# Patient Record
Sex: FEMALE | Race: WHITE | NOT HISPANIC OR LATINO | URBAN - METROPOLITAN AREA
[De-identification: names, ages, dates, MRNs, and addresses within clinical notes are randomized per-mention and may not be internally consistent; named-entity substitution may affect disease eponyms.]

---
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
## 2022-05-25 ENCOUNTER — APPOINTMENT (RX ONLY)
Dept: URBAN - METROPOLITAN AREA CLINIC 115 | Facility: CLINIC | Age: 22
Setting detail: DERMATOLOGY
End: 2022-05-25

## 2022-05-25 DIAGNOSIS — B07.8 OTHER VIRAL WARTS: ICD-10-CM

## 2022-05-25 DIAGNOSIS — L70.0 ACNE VULGARIS: ICD-10-CM

## 2022-05-25 PROCEDURE — 99213 OFFICE O/P EST LOW 20 MIN: CPT | Mod: 25

## 2022-05-25 PROCEDURE — ? BENIGN DESTRUCTION

## 2022-05-25 PROCEDURE — ? COUNSELING

## 2022-05-25 PROCEDURE — ? PRESCRIPTION

## 2022-05-25 PROCEDURE — 17110 DESTRUCTION B9 LES UP TO 14: CPT

## 2022-05-25 RX ORDER — CLINDAMYCIN PHOSPHATE 10 MG/ML
SOLUTION TOPICAL
Qty: 1 | Refills: 0 | Status: ERX | COMMUNITY
Start: 2022-05-25 | End: 2022-09-01

## 2022-05-25 RX ADMIN — CLINDAMYCIN PHOSPHATE: 10 SOLUTION TOPICAL at 00:00

## 2022-05-25 ASSESSMENT — LOCATION DETAILED DESCRIPTION DERM
LOCATION DETAILED: LEFT DISTAL ULNAR THUMB
LOCATION DETAILED: RIGHT MID DORSAL INDEX FINGER
LOCATION DETAILED: RIGHT PROXIMAL PRETIBIAL REGION
LOCATION DETAILED: RIGHT MID DORSAL MIDDLE FINGER
LOCATION DETAILED: LEFT INDEX FINGER DISTAL INTERPHALANGEAL JOINT

## 2022-05-25 ASSESSMENT — LOCATION ZONE DERM
LOCATION ZONE: LEG
LOCATION ZONE: FINGER

## 2022-05-25 ASSESSMENT — LOCATION SIMPLE DESCRIPTION DERM
LOCATION SIMPLE: RIGHT PRETIBIAL REGION
LOCATION SIMPLE: RIGHT INDEX FINGER
LOCATION SIMPLE: RIGHT MIDDLE FINGER
LOCATION SIMPLE: LEFT INDEX FINGER
LOCATION SIMPLE: LEFT THUMB

## 2022-05-25 NOTE — PROCEDURE: BENIGN DESTRUCTION
Consent: The patient's consent was obtained including but not limited to risks of crusting, scabbing, blistering, scarring, darker or lighter pigmentary change, recurrence, incomplete removal and infection.
Post-Care Instructions: I reviewed with the patient in detail post-care instructions. Patient is to wear sunprotection, and avoid picking at any of the treated lesions. Pt may apply Vaseline to crusted or scabbing areas.
Medical Necessity Information: It is in your best interest to select a reason for this procedure from the list below. All of these items fulfill various CMS LCD requirements except the new and changing color options.
Render Post-Care Instructions In Note?: no
Treatment Number (Will Not Render If 0): 0
Anesthesia Volume In Cc: 0.5
Detail Level: Detailed
Medical Necessity Clause: This procedure was medically necessary because the lesions that were treated were:

## 2022-05-25 NOTE — PROCEDURE: COUNSELING
Detail Level: Detailed
Include Pregnancy/Lactation Warning?: No
Doxycycline Counseling:  Patient counseled regarding possible photosensitivity and increased risk for sunburn.  Patient instructed to avoid sunlight, if possible.  When exposed to sunlight, patients should wear protective clothing, sunglasses, and sunscreen.  The patient was instructed to call the office immediately if the following severe adverse effects occur:  hearing changes, easy bruising/bleeding, severe headache, or vision changes.  The patient verbalized understanding of the proper use and possible adverse effects of doxycycline.  All of the patient's questions and concerns were addressed.
Birth Control Pills Counseling: Birth Control Pill Counseling: I discussed with the patient the potential side effects of OCPs including but not limited to increased risk of stroke, heart attack, thrombophlebitis, deep venous thrombosis, hepatic adenomas, breast changes, GI upset, headaches, and depression.  The patient verbalized understanding of the proper use and possible adverse effects of OCPs. All of the patient's questions and concerns were addressed.
Topical Retinoid Pregnancy And Lactation Text: This medication is Pregnancy Category C. It is unknown if this medication is excreted in breast milk.
Isotretinoin Pregnancy And Lactation Text: This medication is Pregnancy Category X and is considered extremely dangerous during pregnancy. It is unknown if it is excreted in breast milk.
Tetracycline Counseling: Patient counseled regarding possible photosensitivity and increased risk for sunburn.  Patient instructed to avoid sunlight, if possible.  When exposed to sunlight, patients should wear protective clothing, sunglasses, and sunscreen.  The patient was instructed to call the office immediately if the following severe adverse effects occur:  hearing changes, easy bruising/bleeding, severe headache, or vision changes.  The patient verbalized understanding of the proper use and possible adverse effects of tetracycline.  All of the patient's questions and concerns were addressed. Patient understands to avoid pregnancy while on therapy due to potential birth defects.
Sarecycline Counseling: Patient advised regarding possible photosensitivity and discoloration of the teeth, skin, lips, tongue and gums.  Patient instructed to avoid sunlight, if possible.  When exposed to sunlight, patients should wear protective clothing, sunglasses, and sunscreen.  The patient was instructed to call the office immediately if the following severe adverse effects occur:  hearing changes, easy bruising/bleeding, severe headache, or vision changes.  The patient verbalized understanding of the proper use and possible adverse effects of sarecycline.  All of the patient's questions and concerns were addressed.
Azelaic Acid Pregnancy And Lactation Text: This medication is considered safe during pregnancy and breast feeding.
Doxycycline Pregnancy And Lactation Text: This medication is Pregnancy Category D and not consider safe during pregnancy. It is also excreted in breast milk but is considered safe for shorter treatment courses.
Azithromycin Counseling:  I discussed with the patient the risks of azithromycin including but not limited to GI upset, allergic reaction, drug rash, diarrhea, and yeast infections.
Topical Sulfur Applications Counseling: Topical Sulfur Counseling: Patient counseled that this medication may cause skin irritation or allergic reactions.  In the event of skin irritation, the patient was advised to reduce the amount of the drug applied or use it less frequently.   The patient verbalized understanding of the proper use and possible adverse effects of topical sulfur application.  All of the patient's questions and concerns were addressed.
Tazorac Counseling:  Patient advised that medication is irritating and drying.  Patient may need to apply sparingly and wash off after an hour before eventually leaving it on overnight.  The patient verbalized understanding of the proper use and possible adverse effects of tazorac.  All of the patient's questions and concerns were addressed.
Birth Control Pills Pregnancy And Lactation Text: This medication should be avoided if pregnant and for the first 30 days post-partum.
Tetracycline Pregnancy And Lactation Text: This medication is Pregnancy Category D and not consider safe during pregnancy. It is also excreted in breast milk.
High Dose Vitamin A Counseling: Side effects reviewed, pt to contact office should one occur.
Erythromycin Counseling:  I discussed with the patient the risks of erythromycin including but not limited to GI upset, allergic reaction, drug rash, diarrhea, increase in liver enzymes, and yeast infections.
Topical Sulfur Applications Pregnancy And Lactation Text: This medication is Pregnancy Category C and has an unknown safety profile during pregnancy. It is unknown if this topical medication is excreted in breast milk.
Azithromycin Pregnancy And Lactation Text: This medication is considered safe during pregnancy and is also secreted in breast milk.
Benzoyl Peroxide Counseling: Patient counseled that medicine may cause skin irritation and bleach clothing.  In the event of skin irritation, the patient was advised to reduce the amount of the drug applied or use it less frequently.   The patient verbalized understanding of the proper use and possible adverse effects of benzoyl peroxide.  All of the patient's questions and concerns were addressed.
Tazorac Pregnancy And Lactation Text: This medication is not safe during pregnancy. It is unknown if this medication is excreted in breast milk.
High Dose Vitamin A Pregnancy And Lactation Text: High dose vitamin A therapy is contraindicated during pregnancy and breast feeding.
Aklief counseling:  Patient advised to apply a pea-sized amount only at bedtime and wait 30 minutes after washing their face before applying.  If too drying, patient may add a non-comedogenic moisturizer.  The most commonly reported side effects including irritation, redness, scaling, dryness, stinging, burning, itching, and increased risk of sunburn.  The patient verbalized understanding of the proper use and possible adverse effects of retinoids.  All of the patient's questions and concerns were addressed.
Spironolactone Counseling: Patient advised regarding risks of diarrhea, abdominal pain, hyperkalemia, birth defects (for female patients), liver toxicity and renal toxicity. The patient may need blood work to monitor liver and kidney function and potassium levels while on therapy. The patient verbalized understanding of the proper use and possible adverse effects of spironolactone.  All of the patient's questions and concerns were addressed.
Dapsone Counseling: I discussed with the patient the risks of dapsone including but not limited to hemolytic anemia, agranulocytosis, rashes, methemoglobinemia, kidney failure, peripheral neuropathy, headaches, GI upset, and liver toxicity.  Patients who start dapsone require monitoring including baseline LFTs and weekly CBCs for the first month, then every month thereafter.  The patient verbalized understanding of the proper use and possible adverse effects of dapsone.  All of the patient's questions and concerns were addressed.
Erythromycin Pregnancy And Lactation Text: This medication is Pregnancy Category B and is considered safe during pregnancy. It is also excreted in breast milk.
Winlevi Counseling:  I discussed with the patient the risks of topical clascoterone including but not limited to erythema, scaling, itching, and stinging. Patient voiced their understanding.
Benzoyl Peroxide Pregnancy And Lactation Text: This medication is Pregnancy Category C. It is unknown if benzoyl peroxide is excreted in breast milk.
Bactrim Counseling:  I discussed with the patient the risks of sulfa antibiotics including but not limited to GI upset, allergic reaction, drug rash, diarrhea, dizziness, photosensitivity, and yeast infections.  Rarely, more serious reactions can occur including but not limited to aplastic anemia, agranulocytosis, methemoglobinemia, blood dyscrasias, liver or kidney failure, lung infiltrates or desquamative/blistering drug rashes.
Topical Clindamycin Counseling: Patient counseled that this medication may cause skin irritation or allergic reactions.  In the event of skin irritation, the patient was advised to reduce the amount of the drug applied or use it less frequently.   The patient verbalized understanding of the proper use and possible adverse effects of clindamycin.  All of the patient's questions and concerns were addressed.
Minocycline Counseling: Patient advised regarding possible photosensitivity and discoloration of the teeth, skin, lips, tongue and gums.  Patient instructed to avoid sunlight, if possible.  When exposed to sunlight, patients should wear protective clothing, sunglasses, and sunscreen.  The patient was instructed to call the office immediately if the following severe adverse effects occur:  hearing changes, easy bruising/bleeding, severe headache, or vision changes.  The patient verbalized understanding of the proper use and possible adverse effects of minocycline.  All of the patient's questions and concerns were addressed.
Aklief Pregnancy And Lactation Text: It is unknown if this medication is safe to use during pregnancy.  It is unknown if this medication is excreted in breast milk.  Breastfeeding women should use the topical cream on the smallest area of the skin for the shortest time needed while breastfeeding.  Do not apply to nipple and areola.
Dapsone Pregnancy And Lactation Text: This medication is Pregnancy Category C and is not considered safe during pregnancy or breast feeding.
Isotretinoin Counseling: Patient should get monthly blood tests, not donate blood, not drive at night if vision affected, not share medication, and not undergo elective surgery for 6 months after tx completed. Side effects reviewed, pt to contact office should one occur.
Bactrim Pregnancy And Lactation Text: This medication is Pregnancy Category D and is known to cause fetal risk.  It is also excreted in breast milk.
Winlevi Pregnancy And Lactation Text: This medication is considered safe during pregnancy and breastfeeding.
Topical Retinoid counseling:  Patient advised to apply a pea-sized amount only at bedtime and wait 30 minutes after washing their face before applying.  If too drying, patient may add a non-comedogenic moisturizer. The patient verbalized understanding of the proper use and possible adverse effects of retinoids.  All of the patient's questions and concerns were addressed.
Spironolactone Pregnancy And Lactation Text: This medication can cause feminization of the male fetus and should be avoided during pregnancy. The active metabolite is also found in breast milk.
Topical Clindamycin Pregnancy And Lactation Text: This medication is Pregnancy Category B and is considered safe during pregnancy. It is unknown if it is excreted in breast milk.
Azelaic Acid Counseling: Patient counseled that medicine may cause skin irritation and to avoid applying near the eyes.  In the event of skin irritation, the patient was advised to reduce the amount of the drug applied or use it less frequently.   The patient verbalized understanding of the proper use and possible adverse effects of azelaic acid.  All of the patient's questions and concerns were addressed.

## 2023-08-21 ENCOUNTER — APPOINTMENT (RX ONLY)
Dept: URBAN - METROPOLITAN AREA CLINIC 146 | Facility: CLINIC | Age: 23
Setting detail: DERMATOLOGY
End: 2023-08-21

## 2023-08-21 DIAGNOSIS — L84 CORNS AND CALLOSITIES: ICD-10-CM

## 2023-08-21 DIAGNOSIS — B07.8 OTHER VIRAL WARTS: ICD-10-CM

## 2023-08-21 DIAGNOSIS — B07.0 PLANTAR WART: ICD-10-CM

## 2023-08-21 PROCEDURE — ? LIQUID NITROGEN

## 2023-08-21 PROCEDURE — 17110 DESTRUCTION B9 LES UP TO 14: CPT

## 2023-08-21 PROCEDURE — ? TREATMENT REGIMEN

## 2023-08-21 PROCEDURE — ? COUNSELING

## 2023-08-21 ASSESSMENT — LOCATION DETAILED DESCRIPTION DERM
LOCATION DETAILED: RIGHT PROXIMAL PRETIBIAL REGION
LOCATION DETAILED: LEFT DISTAL DORSAL INDEX FINGER
LOCATION DETAILED: LEFT PLANTAR FOREFOOT OVERLYING 4TH METATARSAL
LOCATION DETAILED: RIGHT DISTAL DORSAL RING FINGER
LOCATION DETAILED: PERIUNGUAL SKIN LEFT THUMB
LOCATION DETAILED: RIGHT INDEX DISTAL INTERPHALANGEAL JOINT

## 2023-08-21 ASSESSMENT — LOCATION ZONE DERM
LOCATION ZONE: FINGER
LOCATION ZONE: LEG
LOCATION ZONE: FEET

## 2023-08-21 ASSESSMENT — LOCATION SIMPLE DESCRIPTION DERM
LOCATION SIMPLE: LEFT INDEX FINGER
LOCATION SIMPLE: RIGHT PRETIBIAL REGION
LOCATION SIMPLE: RIGHT RING FINGER
LOCATION SIMPLE: LEFT PLANTAR SURFACE
LOCATION SIMPLE: RIGHT INDEX FINGER
LOCATION SIMPLE: LEFT THUMB

## 2023-08-21 NOTE — HPI: WARTS (VERRUCA)
Is This A New Presentation, Or A Follow-Up?: Warts
Additional History: Patient was previously treated at a different [facility] dermatology 5/2022.

## 2023-08-21 NOTE — PROCEDURE: TREATMENT REGIMEN
Initiate Treatment: In one month pare down warts with a file, apply salicylic acid under occlusion.
Plan: Follow up in one month
Detail Level: Zone

## 2023-09-25 ENCOUNTER — APPOINTMENT (RX ONLY)
Dept: URBAN - METROPOLITAN AREA CLINIC 146 | Facility: CLINIC | Age: 23
Setting detail: DERMATOLOGY
End: 2023-09-25

## 2023-09-25 DIAGNOSIS — B07.0 PLANTAR WART: ICD-10-CM | Status: INADEQUATELY CONTROLLED

## 2023-09-25 DIAGNOSIS — B07.8 OTHER VIRAL WARTS: ICD-10-CM | Status: INADEQUATELY CONTROLLED

## 2023-09-25 PROCEDURE — ? COUNSELING

## 2023-09-25 PROCEDURE — 17110 DESTRUCTION B9 LES UP TO 14: CPT

## 2023-09-25 PROCEDURE — ? TREATMENT REGIMEN

## 2023-09-25 PROCEDURE — ? LIQUID NITROGEN

## 2023-09-25 ASSESSMENT — LOCATION SIMPLE DESCRIPTION DERM
LOCATION SIMPLE: LEFT THUMB
LOCATION SIMPLE: LEFT PLANTAR SURFACE
LOCATION SIMPLE: LEFT INDEX FINGER
LOCATION SIMPLE: RIGHT PRETIBIAL REGION
LOCATION SIMPLE: RIGHT RING FINGER

## 2023-09-25 ASSESSMENT — LOCATION ZONE DERM
LOCATION ZONE: LEG
LOCATION ZONE: FINGER
LOCATION ZONE: FEET

## 2023-09-25 ASSESSMENT — LOCATION DETAILED DESCRIPTION DERM
LOCATION DETAILED: LEFT DISTAL DORSAL INDEX FINGER
LOCATION DETAILED: RIGHT DISTAL DORSAL RING FINGER
LOCATION DETAILED: RIGHT PROXIMAL PRETIBIAL REGION
LOCATION DETAILED: PERIUNGUAL SKIN LEFT THUMB
LOCATION DETAILED: LEFT PLANTAR FOREFOOT OVERLYING 4TH METATARSAL
LOCATION DETAILED: DORSAL INTERPHALANGEAL JOINT LEFT THUMB

## 2023-10-25 ENCOUNTER — APPOINTMENT (RX ONLY)
Dept: URBAN - METROPOLITAN AREA CLINIC 146 | Facility: CLINIC | Age: 23
Setting detail: DERMATOLOGY
End: 2023-10-25

## 2023-10-25 DIAGNOSIS — B07.0 PLANTAR WART: ICD-10-CM | Status: STABLE

## 2023-10-25 DIAGNOSIS — B07.8 OTHER VIRAL WARTS: ICD-10-CM

## 2023-10-25 PROCEDURE — ? LIQUID NITROGEN

## 2023-10-25 PROCEDURE — ? OBSERVATION

## 2023-10-25 PROCEDURE — ? TREATMENT REGIMEN

## 2023-10-25 PROCEDURE — 17110 DESTRUCTION B9 LES UP TO 14: CPT

## 2023-10-25 PROCEDURE — 99213 OFFICE O/P EST LOW 20 MIN: CPT | Mod: 25

## 2023-10-25 PROCEDURE — ? COUNSELING

## 2023-10-25 ASSESSMENT — LOCATION SIMPLE DESCRIPTION DERM
LOCATION SIMPLE: RIGHT PRETIBIAL REGION
LOCATION SIMPLE: LEFT PLANTAR SURFACE
LOCATION SIMPLE: LEFT INDEX FINGER
LOCATION SIMPLE: LEFT THUMB

## 2023-10-25 ASSESSMENT — LOCATION ZONE DERM
LOCATION ZONE: LEG
LOCATION ZONE: FEET
LOCATION ZONE: FINGER

## 2023-10-25 ASSESSMENT — LOCATION DETAILED DESCRIPTION DERM
LOCATION DETAILED: PERIUNGUAL SKIN LEFT THUMB
LOCATION DETAILED: RIGHT PROXIMAL PRETIBIAL REGION
LOCATION DETAILED: LEFT DISTAL DORSAL INDEX FINGER
LOCATION DETAILED: LEFT PLANTAR FOREFOOT OVERLYING 4TH METATARSAL

## 2023-10-25 NOTE — PROCEDURE: TREATMENT REGIMEN
Plan: Follow up in one month
Detail Level: Zone
Continue Regimen: In one week pare down warts with a file, apply salicylic acid under occlusion

## 2023-11-27 ENCOUNTER — APPOINTMENT (RX ONLY)
Dept: URBAN - METROPOLITAN AREA CLINIC 146 | Facility: CLINIC | Age: 23
Setting detail: DERMATOLOGY
End: 2023-11-27

## 2023-11-27 DIAGNOSIS — B07.0 PLANTAR WART: ICD-10-CM

## 2023-11-27 DIAGNOSIS — B07.8 OTHER VIRAL WARTS: ICD-10-CM

## 2023-11-27 PROCEDURE — ? TREATMENT REGIMEN

## 2023-11-27 PROCEDURE — 17110 DESTRUCTION B9 LES UP TO 14: CPT

## 2023-11-27 PROCEDURE — ? LIQUID NITROGEN

## 2023-11-27 PROCEDURE — ? COUNSELING

## 2023-11-27 ASSESSMENT — LOCATION ZONE DERM
LOCATION ZONE: FINGER
LOCATION ZONE: LEG
LOCATION ZONE: FEET

## 2023-11-27 ASSESSMENT — LOCATION DETAILED DESCRIPTION DERM
LOCATION DETAILED: LEFT DISTAL DORSAL INDEX FINGER
LOCATION DETAILED: RIGHT PROXIMAL PRETIBIAL REGION
LOCATION DETAILED: LEFT PLANTAR FOREFOOT OVERLYING 4TH METATARSAL
LOCATION DETAILED: PERIUNGUAL SKIN LEFT THUMB

## 2023-11-27 ASSESSMENT — LOCATION SIMPLE DESCRIPTION DERM
LOCATION SIMPLE: LEFT PLANTAR SURFACE
LOCATION SIMPLE: LEFT THUMB
LOCATION SIMPLE: RIGHT PRETIBIAL REGION
LOCATION SIMPLE: LEFT INDEX FINGER

## 2023-11-27 NOTE — PROCEDURE: LIQUID NITROGEN
Spray Paint Text: The liquid nitrogen was applied to the skin utilizing a spray paint frosting technique.
Add 52 Modifier (Optional): no
Post-Care Instructions: I reviewed with the patient in detail post-care instructions. Patient is to wear sunprotection, and avoid picking at any of the treated lesions. Pt may apply Vaseline to crusted or scabbing areas.
Detail Level: Detailed
Show Applicator Variable?: Yes
Consent: The patient's consent was obtained including but not limited to risks of crusting, scabbing, blistering, scarring, darker or lighter pigmentary change, recurrence, incomplete removal and infection.
Medical Necessity Information: It is in your best interest to select a reason for this procedure from the list below. All of these items fulfill various CMS LCD requirements except the new and changing color options.
Medical Necessity Clause: This procedure was medically necessary because the lesions that were treated were:

## 2024-01-04 ENCOUNTER — APPOINTMENT (RX ONLY)
Dept: URBAN - METROPOLITAN AREA CLINIC 146 | Facility: CLINIC | Age: 24
Setting detail: DERMATOLOGY
End: 2024-01-04

## 2024-01-04 DIAGNOSIS — B07.0 PLANTAR WART: ICD-10-CM

## 2024-01-04 DIAGNOSIS — B07.8 OTHER VIRAL WARTS: ICD-10-CM

## 2024-01-04 PROCEDURE — ? LIQUID NITROGEN

## 2024-01-04 PROCEDURE — ? COUNSELING

## 2024-01-04 PROCEDURE — ? PRESCRIPTION

## 2024-01-04 PROCEDURE — 17110 DESTRUCTION B9 LES UP TO 14: CPT

## 2024-01-04 PROCEDURE — ? PRESCRIPTION MEDICATION MANAGEMENT

## 2024-01-04 RX ORDER — IMIQUIMOD 12.5 MG/.25G
CREAM TOPICAL
Qty: 12 | Refills: 0 | Status: ERX | COMMUNITY
Start: 2024-01-04

## 2024-01-04 RX ADMIN — IMIQUIMOD: 12.5 CREAM TOPICAL at 00:00

## 2024-01-04 ASSESSMENT — LOCATION ZONE DERM
LOCATION ZONE: FINGER
LOCATION ZONE: FEET
LOCATION ZONE: LEG

## 2024-01-04 ASSESSMENT — LOCATION SIMPLE DESCRIPTION DERM
LOCATION SIMPLE: RIGHT PRETIBIAL REGION
LOCATION SIMPLE: LEFT INDEX FINGER
LOCATION SIMPLE: RIGHT RING FINGER
LOCATION SIMPLE: LEFT PLANTAR SURFACE
LOCATION SIMPLE: LEFT THUMB

## 2024-01-04 ASSESSMENT — LOCATION DETAILED DESCRIPTION DERM
LOCATION DETAILED: PERIUNGUAL SKIN LEFT THUMB
LOCATION DETAILED: LEFT DISTAL DORSAL INDEX FINGER
LOCATION DETAILED: LEFT PLANTAR FOREFOOT OVERLYING 4TH METATARSAL
LOCATION DETAILED: RIGHT DISTAL RADIAL DORSAL RING FINGER
LOCATION DETAILED: RIGHT PROXIMAL PRETIBIAL REGION
LOCATION DETAILED: RIGHT DISTAL DORSAL RING FINGER

## 2024-01-04 NOTE — PROCEDURE: PRESCRIPTION MEDICATION MANAGEMENT
Continue Regimen: OTC salicylic acid
Initiate Treatment: imiquimod 5 % topical cream packet \\nSig: Apply thin layer to warts Monday-Friday in the evening
Detail Level: Simple
Render In Strict Bullet Format?: No

## 2024-02-05 ENCOUNTER — APPOINTMENT (RX ONLY)
Dept: URBAN - METROPOLITAN AREA CLINIC 146 | Facility: CLINIC | Age: 24
Setting detail: DERMATOLOGY
End: 2024-02-05

## 2024-02-05 DIAGNOSIS — B07.8 OTHER VIRAL WARTS: ICD-10-CM

## 2024-02-05 DIAGNOSIS — B07.0 PLANTAR WART: ICD-10-CM

## 2024-02-05 PROCEDURE — 17110 DESTRUCTION B9 LES UP TO 14: CPT

## 2024-02-05 PROCEDURE — ? COUNSELING

## 2024-02-05 PROCEDURE — ? PRESCRIPTION MEDICATION MANAGEMENT

## 2024-02-05 PROCEDURE — ? LIQUID NITROGEN

## 2024-02-05 ASSESSMENT — LOCATION DETAILED DESCRIPTION DERM
LOCATION DETAILED: LEFT DISTAL DORSAL INDEX FINGER
LOCATION DETAILED: LEFT PLANTAR FOREFOOT OVERLYING 4TH METATARSAL
LOCATION DETAILED: PERIUNGUAL SKIN LEFT THUMB

## 2024-02-05 ASSESSMENT — LOCATION ZONE DERM
LOCATION ZONE: FEET
LOCATION ZONE: FINGER

## 2024-02-05 ASSESSMENT — LOCATION SIMPLE DESCRIPTION DERM
LOCATION SIMPLE: LEFT THUMB
LOCATION SIMPLE: LEFT INDEX FINGER
LOCATION SIMPLE: LEFT PLANTAR SURFACE

## 2024-02-05 NOTE — PROCEDURE: PRESCRIPTION MEDICATION MANAGEMENT
Continue Regimen: imiquimod 5 % topical cream packet \\nSig: Apply thin layer to warts Monday-Friday in the evening\\n\\nOTC salicylic acid on weekends
Detail Level: Simple
Render In Strict Bullet Format?: No

## 2024-02-05 NOTE — PROCEDURE: LIQUID NITROGEN
Spray Paint Text: The liquid nitrogen was applied to the skin utilizing a spray paint frosting technique.
Add 52 Modifier (Optional): no
Post-Care Instructions: I reviewed with the patient in detail post-care instructions. Patient is to wear sunprotection, and avoid picking at any of the treated lesions. Pt may apply Vaseline to crusted or scabbing areas.
Detail Level: Detailed
Show Applicator Variable?: Yes
Consent: The patient's consent was obtained including but not limited to risks of crusting, scabbing, blistering, scarring, darker or lighter pigmentary change, recurrence, incomplete removal and infection.
Medical Necessity Information: It is in your best interest to select a reason for this procedure from the list below. All of these items fulfill various CMS LCD requirements except the new and changing color options.
Medical Necessity Clause: This procedure was medically necessary because the lesions that were treated were:
no

## 2024-03-14 ENCOUNTER — APPOINTMENT (RX ONLY)
Dept: URBAN - METROPOLITAN AREA CLINIC 146 | Facility: CLINIC | Age: 24
Setting detail: DERMATOLOGY
End: 2024-03-14

## 2024-03-14 DIAGNOSIS — B07.8 OTHER VIRAL WARTS: ICD-10-CM

## 2024-03-14 DIAGNOSIS — B07.0 PLANTAR WART: ICD-10-CM

## 2024-03-14 PROCEDURE — ? PRESCRIPTION MEDICATION MANAGEMENT

## 2024-03-14 PROCEDURE — ? LIQUID NITROGEN

## 2024-03-14 PROCEDURE — 17110 DESTRUCTION B9 LES UP TO 14: CPT

## 2024-03-14 PROCEDURE — ? COUNSELING

## 2024-03-14 ASSESSMENT — LOCATION ZONE DERM
LOCATION ZONE: FINGER
LOCATION ZONE: FEET

## 2024-03-14 ASSESSMENT — LOCATION DETAILED DESCRIPTION DERM
LOCATION DETAILED: RIGHT DISTAL DORSAL RING FINGER
LOCATION DETAILED: PERIUNGUAL SKIN LEFT THUMB
LOCATION DETAILED: LEFT PLANTAR FOREFOOT OVERLYING 4TH METATARSAL
LOCATION DETAILED: LEFT DISTAL DORSAL INDEX FINGER

## 2024-03-14 ASSESSMENT — LOCATION SIMPLE DESCRIPTION DERM
LOCATION SIMPLE: LEFT PLANTAR SURFACE
LOCATION SIMPLE: LEFT INDEX FINGER
LOCATION SIMPLE: LEFT THUMB
LOCATION SIMPLE: RIGHT RING FINGER

## 2024-04-16 ENCOUNTER — APPOINTMENT (RX ONLY)
Dept: URBAN - METROPOLITAN AREA CLINIC 146 | Facility: CLINIC | Age: 24
Setting detail: DERMATOLOGY
End: 2024-04-16

## 2024-04-16 DIAGNOSIS — B07.0 PLANTAR WART: ICD-10-CM | Status: IMPROVED

## 2024-04-16 DIAGNOSIS — B07.8 OTHER VIRAL WARTS: ICD-10-CM | Status: IMPROVED

## 2024-04-16 PROCEDURE — 99212 OFFICE O/P EST SF 10 MIN: CPT | Mod: 25

## 2024-04-16 PROCEDURE — ? LIQUID NITROGEN

## 2024-04-16 PROCEDURE — ? PRESCRIPTION MEDICATION MANAGEMENT

## 2024-04-16 PROCEDURE — ? COUNSELING

## 2024-04-16 PROCEDURE — 17110 DESTRUCTION B9 LES UP TO 14: CPT

## 2024-04-16 ASSESSMENT — LOCATION SIMPLE DESCRIPTION DERM
LOCATION SIMPLE: LEFT THUMB
LOCATION SIMPLE: LEFT PLANTAR SURFACE
LOCATION SIMPLE: RIGHT RING FINGER
LOCATION SIMPLE: LEFT INDEX FINGER

## 2024-04-16 ASSESSMENT — LOCATION DETAILED DESCRIPTION DERM
LOCATION DETAILED: PERIUNGUAL SKIN LEFT THUMB
LOCATION DETAILED: LEFT PLANTAR FOREFOOT OVERLYING 4TH METATARSAL
LOCATION DETAILED: RIGHT DISTAL DORSAL RING FINGER
LOCATION DETAILED: LEFT DISTAL DORSAL INDEX FINGER

## 2024-04-16 ASSESSMENT — LOCATION ZONE DERM
LOCATION ZONE: FINGER
LOCATION ZONE: FEET

## 2024-04-16 NOTE — PROCEDURE: PRESCRIPTION MEDICATION MANAGEMENT
Detail Level: Simple
Render In Strict Bullet Format?: No
Continue Regimen: salicylic acid
Discontinue Regimen: Imiquimod

## 2024-04-16 NOTE — PROCEDURE: COUNSELING
Detail Level: Detailed
Patient Specific Counseling (Will Not Stick From Patient To Patient): RTC if lesions recur
mrs [email or web address]

## 2024-05-16 ENCOUNTER — APPOINTMENT (RX ONLY)
Dept: URBAN - METROPOLITAN AREA CLINIC 146 | Facility: CLINIC | Age: 24
Setting detail: DERMATOLOGY
End: 2024-05-16

## 2024-05-16 DIAGNOSIS — B07.0 PLANTAR WART: ICD-10-CM

## 2024-05-16 DIAGNOSIS — B07.8 OTHER VIRAL WARTS: ICD-10-CM

## 2024-05-16 PROCEDURE — ? COUNSELING

## 2024-05-16 PROCEDURE — 17110 DESTRUCTION B9 LES UP TO 14: CPT

## 2024-05-16 PROCEDURE — ? LIQUID NITROGEN

## 2024-05-16 ASSESSMENT — LOCATION SIMPLE DESCRIPTION DERM
LOCATION SIMPLE: LEFT PLANTAR SURFACE
LOCATION SIMPLE: LEFT INDEX FINGER
LOCATION SIMPLE: LEFT MIDDLE FINGER

## 2024-05-16 ASSESSMENT — LOCATION DETAILED DESCRIPTION DERM
LOCATION DETAILED: LEFT PLANTAR FOREFOOT OVERLYING 3RD METATARSAL
LOCATION DETAILED: LEFT MIDDLE FINGER DISTAL INTERPHALANGEAL JOINT
LOCATION DETAILED: LEFT DISTAL DORSAL INDEX FINGER

## 2024-05-16 ASSESSMENT — LOCATION ZONE DERM
LOCATION ZONE: FINGER
LOCATION ZONE: FEET

## 2024-05-16 NOTE — PROCEDURE: COUNSELING
Detail Level: Detailed
Patient Specific Counseling (Will Not Stick From Patient To Patient): RTC if lesions recur

## 2024-06-17 ENCOUNTER — APPOINTMENT (RX ONLY)
Dept: URBAN - METROPOLITAN AREA CLINIC 146 | Facility: CLINIC | Age: 24
Setting detail: DERMATOLOGY
End: 2024-06-17

## 2024-06-17 DIAGNOSIS — B07.0 PLANTAR WART: ICD-10-CM

## 2024-06-17 PROCEDURE — ? COUNSELING

## 2024-06-17 PROCEDURE — ? LIQUID NITROGEN

## 2024-06-17 PROCEDURE — 17110 DESTRUCTION B9 LES UP TO 14: CPT

## 2024-06-17 ASSESSMENT — LOCATION DETAILED DESCRIPTION DERM
LOCATION DETAILED: LEFT PLANTAR FOREFOOT OVERLYING 3RD METATARSAL
LOCATION DETAILED: LEFT PLANTAR FOREFOOT OVERLYING 5TH METATARSAL

## 2024-06-17 ASSESSMENT — LOCATION SIMPLE DESCRIPTION DERM: LOCATION SIMPLE: LEFT PLANTAR SURFACE

## 2024-06-17 ASSESSMENT — LOCATION ZONE DERM: LOCATION ZONE: FEET
